# Patient Record
Sex: MALE | Race: WHITE | NOT HISPANIC OR LATINO
[De-identification: names, ages, dates, MRNs, and addresses within clinical notes are randomized per-mention and may not be internally consistent; named-entity substitution may affect disease eponyms.]

---

## 2019-12-06 ENCOUNTER — APPOINTMENT (OUTPATIENT)
Dept: VASCULAR SURGERY | Facility: CLINIC | Age: 79
End: 2019-12-06

## 2022-05-02 ENCOUNTER — TRANSCRIPTION ENCOUNTER (OUTPATIENT)
Age: 82
End: 2022-05-02

## 2022-06-30 ENCOUNTER — TRANSCRIPTION ENCOUNTER (OUTPATIENT)
Age: 82
End: 2022-06-30

## 2022-07-05 ENCOUNTER — APPOINTMENT (OUTPATIENT)
Dept: SURGERY | Facility: CLINIC | Age: 82
End: 2022-07-05

## 2022-08-09 ENCOUNTER — APPOINTMENT (OUTPATIENT)
Dept: SURGERY | Facility: CLINIC | Age: 82
End: 2022-08-09

## 2022-09-14 PROBLEM — Z00.00 ENCOUNTER FOR PREVENTIVE HEALTH EXAMINATION: Status: ACTIVE | Noted: 2022-09-14

## 2022-10-18 ENCOUNTER — TRANSCRIPTION ENCOUNTER (OUTPATIENT)
Age: 82
End: 2022-10-18

## 2023-02-06 ENCOUNTER — TRANSCRIPTION ENCOUNTER (OUTPATIENT)
Age: 83
End: 2023-02-06

## 2023-02-09 ENCOUNTER — APPOINTMENT (OUTPATIENT)
Dept: CARE COORDINATION | Facility: HOME HEALTH | Age: 83
End: 2023-02-09
Payer: MEDICARE

## 2023-02-09 VITALS
DIASTOLIC BLOOD PRESSURE: 50 MMHG | OXYGEN SATURATION: 94 % | RESPIRATION RATE: 16 BRPM | HEART RATE: 87 BPM | SYSTOLIC BLOOD PRESSURE: 120 MMHG

## 2023-02-09 DIAGNOSIS — G31.84 MILD COGNITIVE IMPAIRMENT, SO STATED: ICD-10-CM

## 2023-02-09 DIAGNOSIS — Z87.891 PERSONAL HISTORY OF NICOTINE DEPENDENCE: ICD-10-CM

## 2023-02-09 PROCEDURE — 99495 TRANSJ CARE MGMT MOD F2F 14D: CPT

## 2023-02-10 PROBLEM — G31.84 MILD COGNITIVE IMPAIRMENT: Status: RESOLVED | Noted: 2023-02-10 | Resolved: 2023-02-10

## 2023-02-10 PROBLEM — Z87.891 FORMER SMOKER: Status: ACTIVE | Noted: 2023-02-10

## 2023-02-10 RX ORDER — PANTOPRAZOLE 40 MG/1
40 TABLET, DELAYED RELEASE ORAL DAILY
Refills: 0 | Status: ACTIVE | COMMUNITY

## 2023-02-10 RX ORDER — UMECLIDINIUM 62.5 UG/1
62.5 AEROSOL, POWDER ORAL DAILY
Refills: 0 | Status: ACTIVE | COMMUNITY

## 2023-02-10 RX ORDER — FUROSEMIDE 20 MG/1
20 TABLET ORAL
Refills: 0 | Status: ACTIVE | COMMUNITY

## 2023-02-10 RX ORDER — ACETAMINOPHEN 325 MG/1
TABLET, FILM COATED ORAL
Refills: 0 | Status: ACTIVE | COMMUNITY

## 2023-02-10 RX ORDER — ALBUTEROL SULFATE 90 UG/1
108 (90 BASE) INHALANT RESPIRATORY (INHALATION)
Refills: 0 | Status: ACTIVE | COMMUNITY

## 2023-02-10 NOTE — PHYSICAL EXAM
[No Acute Distress] : no acute distress [Well Nourished] : well nourished [Well Developed] : well developed [Well-Appearing] : well-appearing [Normal Sclera/Conjunctiva] : normal sclera/conjunctiva [Normal Outer Ear/Nose] : the outer ears and nose were normal in appearance [Supple] : supple [No Respiratory Distress] : no respiratory distress  [No Accessory Muscle Use] : no accessory muscle use [Clear to Auscultation] : lungs were clear to auscultation bilaterally [Normal Rate] : normal rate  [Irregularly Irregular] : irregularly irregular [No Edema] : there was no peripheral edema [Soft] : abdomen soft [Non Tender] : non-tender [Non-distended] : non-distended [Normal Bowel Sounds] : normal bowel sounds [No Rash] : no rash [Normal Affect] : the affect was normal [Alert and Oriented x3] : oriented to person, place, and time [Normal Mood] : the mood was normal

## 2023-02-10 NOTE — ASSESSMENT
[FreeTextEntry1] : Patient is a 82 year y/o male enrolled in the [facility] program with a history of history of a TAVR in 2018, history of pneumonia on October of last year, diastolic CHF on Lasix,  history of AFib on Eliquis, also with pacemaker, mild cognitive impairment, neuropathy recently admitted from 2/3/23-2/6/23 to [facility] fro pneumonia.  Hospital record reviewed.  As per discharge summary: Patient came "in with shortness of breath after a colonoscopy and upper endoscopy that he had. He was found to have an aspiration pneumonia and was started on Rocephin. He was able to be weaned off O2. Swallow evaluation was performed and it was recommended that he be started on a soft, bite sized diet with mildly thickened liquids. He should arrange for a MBS outpatient. Patient was evaluated by physical therapy as well who recommended using a rolling walker at home with skilled PT 2-4x/week. CM was consulted who set up home care/PT referral for home. At this time patient is stable for discharge."\par \par Patient contact by TCM RN on 2/7/23 and d/c instructions reviewed.  Discharge medications were reviewed and reconciled with the current medication list and medications in home.  Documentation can be found in clinical viewer.\par \par Patient evaluated in home and on arrival patient alert and oriented x3, and in no acute distress.  Patient states he is feeling well and denies shortness of breath or coughing.  As per wife, patient seemed more fatigued yesterday, but today seems more like himself.  Patient states they are using the thickener to thicken liquids as recommended and does not notice coughing while eating.  Patient states he is still have left shoulder pain and is using the sling.  Patient states he is taking Tylenol on occasion.  Advised patient to take Tylenol on a schedule every 6 hours for better pain control.  Patient states he has a follow-up with his PCP on Monday 2/13.  Patient states he is eating and drinking well.  Advised patient to do deep breathing and coughing exercises.  Patient denies chest pain, palpitations, shortness of breath, abdominal pain, nausea, vomiting, diarrhea, lightheaded or dizziness.\par \par Patient with no other questions or concerns at this time.  Patient given yellow card with contact information on it and advised to call with any questions or concerns.\par

## 2023-02-10 NOTE — PLAN
[FreeTextEntry1] : - Follow-up with PCP Dr. Sofia on Monday 2/13/23 as scheduled.\par \par - Patient verbalized understanding of plan as above, advised to call with any questions or concerns.  Yellow card with contact info given.\par \par

## 2023-02-10 NOTE — HISTORY OF PRESENT ILLNESS
[Spouse] : spouse [FreeTextEntry1] : Follow-up for discharge from [facility] for pneumonia\par  [de-identified] : Patient is a 82 year y/o male enrolled in the [facility] program with a history of history of a TAVR in 2018, history of pneumonia on October of last year, diastolic CHF on Lasix,  history of AFib on Eliquis, also with pacemaker, mild cognitive impairment, neuropathy recently admitted from 2/3/23-2/6/23 to [facility] fro pneumonia.  Hospital record reviewed.  As per discharge summary: Patient came "in with shortness of breath after a colonoscopy and upper endoscopy that he had. He was found to have an aspiration pneumonia and was started on Rocephin. He was able to be weaned off O2. Swallow evaluation was performed and it was recommended that he be started on a soft, bite sized diet with mildly thickened liquids. He should arrange for a MBS outpatient. Patient was evaluated by physical therapy as well who recommended using a rolling walker at home with skilled PT 2-4x/week. CM was consulted who set up home care/PT referral for home. At this time patient is stable for discharge."\par \par Patient contact by TCM RN on 2/7/23 and d/c instructions reviewed.  Discharge medications were reviewed and reconciled with the current medication list and medications in home.  Documentation can be found in clinical viewer.\par \par Patient evaluated in home and on arrival patient alert and oriented x3, and in no acute distress.  Patient states he is feeling well and denies shortness of breath or coughing.  As per wife, patient seemed more fatigued yesterday, but today seems more like himself.  Patient states they are using the thickener to thicken liquids as recommended and does not notice coughing while eating.  Patient states he is still have left shoulder pain and is using the sling.  Patient states he is taking Tylenol on occasion.  Advised patient to take Tylenol on a schedule every 6 hours for better pain control.  Patient states he has a follow-up with his PCP on Monday 2/13.  Patient states he is eating and drinking well.  Advised patient to do deep breathing and coughing exercises.  Patient denies chest pain, palpitations, shortness of breath, abdominal pain, nausea, vomiting, diarrhea, lightheaded or dizziness.\par \par Patient with no other questions or concerns at this time.  Patient given yellow card with contact information on it and advised to call with any questions or concerns.\par \par \par

## 2023-02-10 NOTE — REVIEW OF SYSTEMS
[Fever] : no fever [Chills] : no chills [Fatigue] : no fatigue [Chest Pain] : no chest pain [Palpitations] : no palpitations [Lower Ext Edema] : no lower extremity edema [Shortness Of Breath] : no shortness of breath [Cough] : cough [Dyspnea on Exertion] : not dyspnea on exertion [Abdominal Pain] : no abdominal pain [Nausea] : no nausea [Constipation] : no constipation [Diarrhea] : no diarrhea [Vomiting] : no vomiting [Negative] : Neurological [FreeTextEntry9] : Left shoulder pain

## 2023-02-12 ENCOUNTER — TRANSCRIPTION ENCOUNTER (OUTPATIENT)
Age: 83
End: 2023-02-12

## 2023-02-15 ENCOUNTER — TRANSCRIPTION ENCOUNTER (OUTPATIENT)
Age: 83
End: 2023-02-15

## 2023-02-16 ENCOUNTER — APPOINTMENT (OUTPATIENT)
Dept: CARE COORDINATION | Facility: HOME HEALTH | Age: 83
End: 2023-02-16
Payer: MEDICARE

## 2023-02-16 VITALS
HEART RATE: 81 BPM | DIASTOLIC BLOOD PRESSURE: 52 MMHG | SYSTOLIC BLOOD PRESSURE: 112 MMHG | RESPIRATION RATE: 16 BRPM | OXYGEN SATURATION: 95 %

## 2023-02-16 DIAGNOSIS — I48.91 UNSPECIFIED ATRIAL FIBRILLATION: ICD-10-CM

## 2023-02-16 DIAGNOSIS — J18.9 PNEUMONIA, UNSPECIFIED ORGANISM: ICD-10-CM

## 2023-02-16 DIAGNOSIS — M25.512 PAIN IN LEFT SHOULDER: ICD-10-CM

## 2023-02-16 DIAGNOSIS — J44.9 CHRONIC OBSTRUCTIVE PULMONARY DISEASE, UNSPECIFIED: ICD-10-CM

## 2023-02-16 DIAGNOSIS — E11.9 TYPE 2 DIABETES MELLITUS W/OUT COMPLICATIONS: ICD-10-CM

## 2023-02-16 DIAGNOSIS — I50.30 UNSPECIFIED DIASTOLIC (CONGESTIVE) HEART FAILURE: ICD-10-CM

## 2023-02-16 PROCEDURE — 99349 HOME/RES VST EST MOD MDM 40: CPT

## 2023-02-16 RX ORDER — AMOXICILLIN AND CLAVULANATE POTASSIUM 875; 125 MG/1; 1/1
875-125 TABLET, FILM COATED ORAL
Refills: 0 | Status: COMPLETED | COMMUNITY
End: 2023-02-16

## 2023-02-16 NOTE — REVIEW OF SYSTEMS
[Fever] : no fever [Chills] : no chills [Fatigue] : no fatigue [Chest Pain] : no chest pain [Palpitations] : no palpitations [Lower Ext Edema] : no lower extremity edema [Shortness Of Breath] : no shortness of breath [Cough] : no cough [Dyspnea on Exertion] : not dyspnea on exertion [Abdominal Pain] : no abdominal pain [Nausea] : no nausea [Constipation] : no constipation [Diarrhea] : no diarrhea [Vomiting] : no vomiting [Negative] : Neurological [FreeTextEntry9] : Left shoulder pain

## 2023-02-16 NOTE — HISTORY OF PRESENT ILLNESS
[Spouse] : spouse [FreeTextEntry1] : Follow-up for discharge from [facility] for pneumonia\par  [de-identified] : Patient is a 82 year y/o male enrolled in the [facility] program with a history of history of a TAVR in 2018, history of pneumonia on October of last year, diastolic CHF on Lasix,  history of AFib on Eliquis, also with pacemaker, mild cognitive impairment, neuropathy recently admitted from 2/3/23-2/6/23 to [facility] fro pneumonia. Patient seen in follow-up for PNA.  Patient states he is continuing to improve.  Seen by PCP earlier in the week with no concerns.  Patient states he was concerned as he was having diarrhea, but seems it is getting a little better now that he is off the antibiotics.  Advised to continue to monitor.  Patient denies SOB and would like to get back to Pulm rehab soon. Patient states left shoulder pain has also improved and is able to move arm without pain and no longer taking Tylenol for pain.   Advised patient to continue to work with home PT to ensure he is strong enough to return.  Patient denies chest pain, palpitations, shortness of breath, abdominal pain, nausea, vomiting, diarrhea, lightheaded or dizziness.\par

## 2023-02-16 NOTE — PLAN
[FreeTextEntry1] : - Continue follow-up with PCP\par - Follow-up with Pulm Rehab\par \par - Patient verbalized understanding of plan as above, advised to call with any questions or concerns.  Yellow card with contact info given.\par \par

## 2023-02-16 NOTE — ASSESSMENT
[FreeTextEntry1] : Patient is a 82 year y/o male enrolled in the [facility] program with a history of history of a TAVR in 2018, history of pneumonia on October of last year, diastolic CHF on Lasix,  history of AFib on Eliquis, also with pacemaker, mild cognitive impairment, neuropathy recently admitted from 2/3/23-2/6/23 to [facility] fro pneumonia. Patient seen in follow-up for PNA.  Patient states he is continuing to improve.  Seen by PCP earlier in the week with no concerns.  Patient states he was concerned as he was having diarrhea, but seems it is getting a little better now that he is off the antibiotics.  Advised to continue to monitor.  Patient denies SOB and would like to get back to Pulm rehab soon. Patient states left shoulder pain has also improved and is able to move arm without pain and no longer taking Tylenol for pain.   Advised patient to continue to work with home PT to ensure he is strong enough to return.  Patient denies chest pain, palpitations, shortness of breath, abdominal pain, nausea, vomiting, diarrhea, lightheaded or dizziness.

## 2023-02-21 ENCOUNTER — TRANSCRIPTION ENCOUNTER (OUTPATIENT)
Age: 83
End: 2023-02-21

## 2023-03-01 ENCOUNTER — TRANSCRIPTION ENCOUNTER (OUTPATIENT)
Age: 83
End: 2023-03-01

## 2023-03-03 ENCOUNTER — TRANSCRIPTION ENCOUNTER (OUTPATIENT)
Age: 83
End: 2023-03-03

## 2023-03-10 ENCOUNTER — TRANSCRIPTION ENCOUNTER (OUTPATIENT)
Age: 83
End: 2023-03-10

## 2023-10-17 ENCOUNTER — APPOINTMENT (OUTPATIENT)
Dept: RADIATION ONCOLOGY | Facility: CLINIC | Age: 83
End: 2023-10-17
Payer: MEDICARE

## 2023-10-17 DIAGNOSIS — Z87.09 PERSONAL HISTORY OF OTHER DISEASES OF THE RESPIRATORY SYSTEM: ICD-10-CM

## 2023-10-17 DIAGNOSIS — I25.10 ATHEROSCLEROTIC HEART DISEASE OF NATIVE CORONARY ARTERY W/OUT ANGINA PECTORIS: ICD-10-CM

## 2023-10-17 DIAGNOSIS — K28.9 GASTROJEJUNAL ULCER, UNSPECIFIED AS ACUTE OR CHRONIC, W/OUT HEMORRHAGE OR PERFORATION: ICD-10-CM

## 2023-10-17 DIAGNOSIS — Z86.79 PERSONAL HISTORY OF OTHER DISEASES OF THE CIRCULATORY SYSTEM: ICD-10-CM

## 2023-10-17 DIAGNOSIS — Z87.39 PERSONAL HISTORY OF OTHER DISEASES OF THE MUSCULOSKELETAL SYSTEM AND CONNECTIVE TISSUE: ICD-10-CM

## 2023-10-17 DIAGNOSIS — E11.9 TYPE 2 DIABETES MELLITUS W/OUT COMPLICATIONS: ICD-10-CM

## 2023-10-17 DIAGNOSIS — E78.5 HYPERLIPIDEMIA, UNSPECIFIED: ICD-10-CM

## 2023-10-17 PROCEDURE — 99205 OFFICE O/P NEW HI 60 MIN: CPT | Mod: 25

## 2023-10-17 RX ORDER — GABAPENTIN 600 MG/1
600 TABLET, COATED ORAL
Refills: 0 | Status: DISCONTINUED | COMMUNITY
End: 2023-10-17

## 2023-10-17 RX ORDER — FLUTICASONE PROPIONATE AND SALMETEROL 500; 50 UG/1; UG/1
500-50 POWDER RESPIRATORY (INHALATION) TWICE DAILY
Refills: 0 | Status: DISCONTINUED | COMMUNITY
End: 2023-10-17

## 2023-10-17 RX ORDER — BUDESONIDE AND FORMOTEROL FUMARATE DIHYDRATE 160; 4.5 UG/1; UG/1
160-4.5 AEROSOL RESPIRATORY (INHALATION)
Refills: 0 | Status: ACTIVE | COMMUNITY

## 2023-10-17 RX ORDER — METFORMIN HYDROCHLORIDE 500 MG/1
500 TABLET, COATED ORAL DAILY
Refills: 0 | Status: DISCONTINUED | COMMUNITY
End: 2023-10-17

## 2023-10-17 RX ORDER — TIOTROPIUM BROMIDE 18 UG/1
18 CAPSULE ORAL; RESPIRATORY (INHALATION)
Refills: 0 | Status: ACTIVE | COMMUNITY

## 2023-10-24 ENCOUNTER — RESULT REVIEW (OUTPATIENT)
Age: 83
End: 2023-10-24

## 2023-11-15 ENCOUNTER — NON-APPOINTMENT (OUTPATIENT)
Age: 83
End: 2023-11-15

## 2023-11-15 VITALS
HEART RATE: 91 BPM | SYSTOLIC BLOOD PRESSURE: 123 MMHG | RESPIRATION RATE: 16 BRPM | DIASTOLIC BLOOD PRESSURE: 57 MMHG | HEIGHT: 71 IN | OXYGEN SATURATION: 95 % | BODY MASS INDEX: 20.86 KG/M2 | WEIGHT: 149 LBS

## 2023-12-27 ENCOUNTER — NON-APPOINTMENT (OUTPATIENT)
Age: 83
End: 2023-12-27

## 2023-12-27 ENCOUNTER — APPOINTMENT (OUTPATIENT)
Dept: RADIATION ONCOLOGY | Facility: CLINIC | Age: 83
End: 2023-12-27
Payer: MEDICARE

## 2023-12-27 DIAGNOSIS — C77.0 SECONDARY AND UNSPECIFIED MALIGNANT NEOPLASM OF LYMPH NODES OF HEAD, FACE AND NECK: ICD-10-CM

## 2023-12-27 PROCEDURE — 99024 POSTOP FOLLOW-UP VISIT: CPT

## 2023-12-27 RX ORDER — OXYCODONE 5 MG/1
5 TABLET ORAL
Qty: 20 | Refills: 0 | Status: DISCONTINUED | COMMUNITY
Start: 2023-10-18 | End: 2023-12-27

## 2023-12-27 RX ORDER — LORAZEPAM 0.5 MG/1
0.5 TABLET ORAL
Qty: 8 | Refills: 0 | Status: DISCONTINUED | COMMUNITY
Start: 2023-11-13 | End: 2023-12-27

## 2023-12-27 RX ORDER — APIXABAN 5 MG/1
5 TABLET, FILM COATED ORAL
Refills: 0 | Status: DISCONTINUED | COMMUNITY
End: 2023-12-27

## 2023-12-27 RX ORDER — ATORVASTATIN CALCIUM 20 MG/1
20 TABLET, FILM COATED ORAL DAILY
Refills: 0 | Status: DISCONTINUED | COMMUNITY
End: 2023-12-27

## 2023-12-27 RX ORDER — METHADONE HYDROCHLORIDE 5 MG/1
TABLET ORAL
Refills: 0 | Status: ACTIVE | COMMUNITY

## 2023-12-27 RX ORDER — SERTRALINE HYDROCHLORIDE 50 MG/1
50 TABLET, FILM COATED ORAL
Refills: 0 | Status: DISCONTINUED | COMMUNITY
End: 2023-12-27

## 2023-12-27 RX ORDER — MONTELUKAST 10 MG/1
10 TABLET, FILM COATED ORAL DAILY
Refills: 0 | Status: DISCONTINUED | COMMUNITY
End: 2023-12-27

## 2023-12-27 NOTE — DISEASE MANAGEMENT
[FreeTextEntry4] : cancer of unknown primary [TTNM] : x [NTNM] : x [MTNM] : x [de-identified] : 5 Fxs of RT to the LEFT neck to 4400 cGY completed 11/13/23

## 2023-12-27 NOTE — PHYSICAL EXAM
[Sclera] : the sclera and conjunctiva were normal [Examination Of The Oral Cavity] : the lips and gums were normal [Normal Oral Cavity] : oral cavity was normal [Oropharynx] : The oropharynx was normal [Nondistended] : nondistended [Skin Color & Pigmentation] : normal skin color and pigmentation [] : no rash [Skin Lesions] : no skin lesions [Normal] : oriented to person, place and time, the affect was normal, the mood was normal and not anxious [de-identified] : no noticeable lesions; no thrush; no mucositis [de-identified] : the left neck node has decreased in size but remains enlarged at approximately 3cm; it is soft and non-tender

## 2023-12-27 NOTE — HISTORY OF PRESENT ILLNESS
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  He reports that he is now on Hospice.  He is taking methadone with good relief.  He also states that he has some difficulty swallowing, hoarseness and NP cough.  His appetite is fair.

## 2023-12-27 NOTE — REVIEW OF SYSTEMS
[Fatigue] : fatigue [Dysphagia] : dysphagia [Hoarseness] : hoarseness [Cough] : cough [Fatigue: Grade 0] : Fatigue: Grade 0 [Mucositis Oral: Grade 0] : Mucositis Oral: Grade 0  [Xerostomia: Grade 1 - Symptomatic (e.g., dry or thick saliva) without significant dietary alteration; unstimulated saliva flow >0.2 ml/min] : Xerostomia: Grade 1 - Symptomatic (e.g., dry or thick saliva) without significant dietary alteration; unstimulated saliva flow >0.2 ml/min [Oral Pain: Grade 0] : Oral Pain: Grade 0 [Skin Hyperpigmentation: Grade 0] : Skin Hyperpigmentation: Grade 0 [Skin Induration: Grade 0] : Skin Induration: Grade 0 [Dermatitis Radiation: Grade 0] : Dermatitis Radiation: Grade 0